# Patient Record
Sex: FEMALE | Race: WHITE | NOT HISPANIC OR LATINO | URBAN - METROPOLITAN AREA
[De-identification: names, ages, dates, MRNs, and addresses within clinical notes are randomized per-mention and may not be internally consistent; named-entity substitution may affect disease eponyms.]

---

## 2020-04-24 ENCOUNTER — APPOINTMENT (OUTPATIENT)
Age: 85
Setting detail: DERMATOLOGY
End: 2020-04-24

## 2020-04-24 DIAGNOSIS — L30.9 DERMATITIS, UNSPECIFIED: ICD-10-CM

## 2020-04-24 PROCEDURE — OTHER TREATMENT REGIMEN: OTHER

## 2020-04-24 PROCEDURE — OTHER RECOMMENDATIONS: OTHER

## 2020-04-24 PROCEDURE — 99202 OFFICE O/P NEW SF 15 MIN: CPT

## 2020-04-24 PROCEDURE — OTHER PRESCRIPTION: OTHER

## 2020-04-24 PROCEDURE — OTHER COUNSELING: OTHER

## 2020-04-24 RX ORDER — TRIAMCINOLONE ACETONIDE 1 MG/G
CREAM TOPICAL
Qty: 1 | Refills: 0 | Status: ERX

## 2020-04-24 ASSESSMENT — LOCATION SIMPLE DESCRIPTION DERM: LOCATION SIMPLE: LEFT THIGH

## 2020-04-24 ASSESSMENT — LOCATION DETAILED DESCRIPTION DERM: LOCATION DETAILED: LEFT ANTERIOR PROXIMAL THIGH

## 2020-04-24 ASSESSMENT — LOCATION ZONE DERM: LOCATION ZONE: LEG

## 2020-04-24 NOTE — PROCEDURE: TREATMENT REGIMEN
Plan: Patient was called after the appointment but no one answered.  I contacted Dr. Chan and am awaiting whether she can see this patient. Will have staff attempt to call the patient next week to let her know the plan.  Patient moving to [place], could see me in office when she arrives, or could see her old derm Dr. Boykin in [facility]. Plan: Patient was called after the appointment but no one answered.  I contacted Dr. Chan and am awaiting whether she can see this patient. Will have staff attempt to call the patient next week to let her know the plan.  Patient moving to [place], could see me in office when she arrives, or could see her old derm Dr. Boykin in [facility].

## 2020-04-24 NOTE — PROCEDURE: RECOMMENDATIONS
Recommendations (Free Text): Rash should be biopsied, patient lives in [place]. Has seen Dr Boykin in [place]. Recommendations (Free Text): Rash should be biopsied, patient lives in [place]. Has seen Dr Boykin in [place].

## 2020-10-02 ENCOUNTER — RX ONLY (RX ONLY)
Age: 85
End: 2020-10-02

## 2020-10-02 ENCOUNTER — APPOINTMENT (OUTPATIENT)
Dept: URBAN - METROPOLITAN AREA CLINIC 285 | Age: 85
Setting detail: DERMATOLOGY
End: 2020-10-02

## 2020-10-02 DIAGNOSIS — Z85.828 PERSONAL HISTORY OF OTHER MALIGNANT NEOPLASM OF SKIN: ICD-10-CM

## 2020-10-02 DIAGNOSIS — L12.0 BULLOUS PEMPHIGOID: ICD-10-CM

## 2020-10-02 DIAGNOSIS — L57.0 ACTINIC KERATOSIS: ICD-10-CM

## 2020-10-02 PROBLEM — D23.5 OTHER BENIGN NEOPLASM OF SKIN OF TRUNK: Status: ACTIVE | Noted: 2020-10-02

## 2020-10-02 PROCEDURE — OTHER COUNSELING: OTHER

## 2020-10-02 PROCEDURE — 17000 DESTRUCT PREMALG LESION: CPT

## 2020-10-02 PROCEDURE — OTHER PRESCRIPTION: OTHER

## 2020-10-02 PROCEDURE — 99214 OFFICE O/P EST MOD 30 MIN: CPT | Mod: 25

## 2020-10-02 PROCEDURE — OTHER TREATMENT REGIMEN: OTHER

## 2020-10-02 PROCEDURE — OTHER LIQUID NITROGEN: OTHER

## 2020-10-02 RX ORDER — TRIAMCINOLONE ACETONIDE 1 MG/G
OINTMENT TOPICAL
Qty: 1 | Refills: 0 | Status: ERX

## 2020-10-02 RX ORDER — NIACINAMIDE 500 MG
TABLET ORAL
Qty: 30 | Refills: 0 | Status: ERX

## 2020-10-02 RX ORDER — TRIAMCINOLONE ACETONIDE 1 MG/G
CREAM TOPICAL
Qty: 1 | Refills: 0 | Status: CANCELLED

## 2020-10-02 ASSESSMENT — LOCATION ZONE DERM
LOCATION ZONE: FACE
LOCATION ZONE: TRUNK
LOCATION ZONE: LIP

## 2020-10-02 ASSESSMENT — LOCATION DETAILED DESCRIPTION DERM
LOCATION DETAILED: LEFT UPPER CUTANEOUS LIP
LOCATION DETAILED: EPIGASTRIC SKIN
LOCATION DETAILED: RIGHT CENTRAL ZYGOMA

## 2020-10-02 ASSESSMENT — BSA RASH: BSA RASH: 30

## 2020-10-02 ASSESSMENT — LOCATION SIMPLE DESCRIPTION DERM
LOCATION SIMPLE: LEFT LIP
LOCATION SIMPLE: RIGHT ZYGOMA
LOCATION SIMPLE: ABDOMEN

## 2020-10-02 NOTE — PROCEDURE: TREATMENT REGIMEN
Discontinue Regimen: Clobetasol cream
Continue Regimen: Doxycycline 100 mg twice daily\\nTriamcinolone oint twice daily
Detail Level: Zone
Plan: Discussed starting prednisone as the next step.  Daughter says \"there was a problem with prednisone\" and when asked to clarify she said \"it didn't work\"
Initiate Treatment: Niacinomide 500 mg qday- will increase to tid

## 2020-10-13 ENCOUNTER — RX ONLY (RX ONLY)
Age: 85
End: 2020-10-13

## 2020-10-13 RX ORDER — TRIAMCINOLONE ACETONIDE 1 MG/G
OINTMENT TOPICAL
Qty: 1 | Refills: 0 | Status: ERX

## 2020-10-14 RX ORDER — TRIAMCINOLONE ACETONIDE 1 MG/G
CREAM TOPICAL
Qty: 1 | Refills: 0 | Status: ERX

## 2020-10-29 ENCOUNTER — APPOINTMENT (OUTPATIENT)
Dept: URBAN - METROPOLITAN AREA CLINIC 285 | Age: 85
Setting detail: DERMATOLOGY
End: 2020-10-29

## 2020-10-29 ENCOUNTER — RX ONLY (RX ONLY)
Age: 85
End: 2020-10-29

## 2020-10-29 DIAGNOSIS — L12.0 BULLOUS PEMPHIGOID: ICD-10-CM

## 2020-10-29 PROCEDURE — OTHER TREATMENT REGIMEN: OTHER

## 2020-10-29 PROCEDURE — OTHER ADDITIONAL NOTES: OTHER

## 2020-10-29 PROCEDURE — OTHER COUNSELING: OTHER

## 2020-10-29 PROCEDURE — 99214 OFFICE O/P EST MOD 30 MIN: CPT

## 2020-10-29 PROCEDURE — OTHER PRESCRIPTION: OTHER

## 2020-10-29 RX ORDER — NIACINAMIDE 500 MG
TABLET ORAL
Qty: 30 | Refills: 1 | Status: ERX | COMMUNITY
Start: 2020-10-29

## 2020-10-29 RX ORDER — CLOBETASOL PROPIONATE 0.5 MG/G
OINTMENT TOPICAL
Qty: 2 | Refills: 2 | Status: ERX | COMMUNITY
Start: 2020-10-29

## 2020-10-29 RX ORDER — TRIAMCINOLONE ACETONIDE 1 MG/G
CREAM TOPICAL
Qty: 1 | Refills: 1 | Status: ERX

## 2020-10-29 ASSESSMENT — LOCATION ZONE DERM: LOCATION ZONE: TRUNK

## 2020-10-29 ASSESSMENT — LOCATION DETAILED DESCRIPTION DERM: LOCATION DETAILED: EPIGASTRIC SKIN

## 2020-10-29 ASSESSMENT — LOCATION SIMPLE DESCRIPTION DERM: LOCATION SIMPLE: ABDOMEN

## 2020-10-29 NOTE — PROCEDURE: TREATMENT REGIMEN
Continue Regimen: Doxycycline 100 mg twice daily\\nTriamcinolone cream twice daily to body\\nNiacinomide 500 mg qday- increase to one pill daily (goal will be tid)

## 2020-10-29 NOTE — PROCEDURE: TREATMENT REGIMEN
Plan: Discussed prednisone. PatientS daughter is very [place] of starting prednisone. She states Adriana gets anxious in the past with prednisone.  Even swish for the mouth\\nDiscussed trying a stronger strength topical steroid.\\nDiscussed celcept - discussed blood testing with that medication\\nDiscussed patient is at the point where she should have a oral treatment.\\nDiscussed starting slowly on prednisone - daughter refused to start prednisone or other oral therapy even 5 mg\\nNot many other options to help control this autoimmune condition\\n\\nMed list reviewed no culprits identified Plan: Discussed prednisone. PatientS daughter is very [place] of starting prednisone. She states Adriana gets anxious in the past with prednisone.  Even swish for the mouth\\nDiscussed trying a stronger strength topical steroid.\\nDiscussed celcept - discussed blood testing with that medication\\nDiscussed patient is at the point where she should have a oral treatment.\\nDiscussed starting slowly on prednisone - daughter refused to start prednisone or other oral therapy even 5 mg\\nNot many other options to help control this autoimmune condition\\n\\nMed list reviewed no culprits identified

## 2020-11-23 ENCOUNTER — RX ONLY (RX ONLY)
Age: 85
End: 2020-11-23

## 2020-11-23 ENCOUNTER — APPOINTMENT (OUTPATIENT)
Dept: URBAN - METROPOLITAN AREA CLINIC 285 | Age: 85
Setting detail: DERMATOLOGY
End: 2020-11-23

## 2020-11-23 DIAGNOSIS — L12.0 BULLOUS PEMPHIGOID: ICD-10-CM

## 2020-11-23 PROCEDURE — 99213 OFFICE O/P EST LOW 20 MIN: CPT

## 2020-11-23 PROCEDURE — OTHER TREATMENT REGIMEN: OTHER

## 2020-11-23 PROCEDURE — OTHER COUNSELING: OTHER

## 2020-11-23 PROCEDURE — OTHER PRESCRIPTION: OTHER

## 2020-11-23 PROCEDURE — OTHER ADDITIONAL NOTES: OTHER

## 2020-11-23 PROCEDURE — OTHER ORDER TESTS: OTHER

## 2020-11-23 RX ORDER — MYCOPHENOLATE MOFETIL 500 MG/1
500 MG TABLET ORAL BID
Qty: 60 | Refills: 0 | Status: CANCELLED

## 2020-11-23 RX ORDER — DOXYCYCLINE HYCLATE 100 MG/1
CAPSULE, GELATIN COATED ORAL
Qty: 60 | Refills: 2 | Status: ERX

## 2020-11-23 RX ORDER — CLOBETASOL PROPIONATE 0.5 MG/G
OINTMENT TOPICAL
Qty: 2 | Refills: 2 | Status: ERX

## 2020-11-23 RX ORDER — PREDNISONE 10 MG/1
TABLET ORAL
Qty: 60 | Refills: 0 | Status: ERX

## 2020-11-23 ASSESSMENT — LOCATION SIMPLE DESCRIPTION DERM: LOCATION SIMPLE: ABDOMEN

## 2020-11-23 ASSESSMENT — LOCATION DETAILED DESCRIPTION DERM: LOCATION DETAILED: EPIGASTRIC SKIN

## 2020-11-23 ASSESSMENT — LOCATION ZONE DERM: LOCATION ZONE: TRUNK

## 2020-11-23 ASSESSMENT — PAIN INTENSITY VAS: HOW INTENSE IS YOUR PAIN 0 BEING NO PAIN, 10 BEING THE MOST SEVERE PAIN POSSIBLE?: 5/10 PAIN

## 2020-11-23 ASSESSMENT — SEVERITY ASSESSMENT: SEVERITY: MODERATE

## 2020-11-23 ASSESSMENT — BSA RASH: BSA RASH: 56

## 2020-11-23 NOTE — PROCEDURE: TREATMENT REGIMEN
Initiate Treatment: Mycophenolate 500 bid - will start once labs are in\\nPrednisone 10 mg take 2 pills qam with food
Plan: Refer to [facility] for rituximab
Detail Level: Zone
Continue Regimen: Triamcinolone cream twice daily to body\\nNiacinomide 500 mg BID\\nPRN. Clobetasol oint bid to feet, under occlusion.

## 2020-11-23 NOTE — PROCEDURE: ORDER TESTS
Clinical Notes (To The Lab): Hepatitis panel
Billing Type: Third-Party Bill
Expected Date Of Service: 11/23/2020
Bill For Surgical Tray: no

## 2020-12-01 ENCOUNTER — RX ONLY (RX ONLY)
Age: 85
End: 2020-12-01

## 2020-12-01 RX ORDER — MYCOPHENOLATE MOFETIL 500 MG/1
1 TABLET ORAL BID
Qty: 60 | Refills: 0 | Status: ERX

## 2020-12-23 ENCOUNTER — RX ONLY (RX ONLY)
Age: 85
End: 2020-12-23

## 2020-12-23 ENCOUNTER — APPOINTMENT (OUTPATIENT)
Dept: URBAN - METROPOLITAN AREA CLINIC 285 | Age: 85
Setting detail: DERMATOLOGY
End: 2020-12-23

## 2020-12-23 DIAGNOSIS — L12.0 BULLOUS PEMPHIGOID: ICD-10-CM

## 2020-12-23 PROCEDURE — OTHER TREATMENT REGIMEN: OTHER

## 2020-12-23 PROCEDURE — OTHER COUNSELING: OTHER

## 2020-12-23 PROCEDURE — 99214 OFFICE O/P EST MOD 30 MIN: CPT

## 2020-12-23 PROCEDURE — OTHER ORDER TESTS: OTHER

## 2020-12-23 PROCEDURE — OTHER PRESCRIPTION: OTHER

## 2020-12-23 RX ORDER — PREDNISONE 1 MG/1
TABLET ORAL
Qty: 63 | Refills: 0 | Status: ERX

## 2020-12-23 RX ORDER — PREDNISONE 2.5 MG/1
TABLET ORAL
Qty: 35 | Refills: 0 | Status: ERX

## 2020-12-23 RX ORDER — MYCOPHENOLATE MOFETIL 500 MG/1
TABLET ORAL
Qty: 120 | Refills: 2 | Status: ERX | COMMUNITY
Start: 2020-12-23

## 2020-12-23 RX ORDER — PREDNISONE 10 MG/1
TABLET ORAL
Qty: 7 | Refills: 0 | Status: ERX

## 2020-12-23 RX ORDER — TRIAMCINOLONE ACETONIDE 1 MG/G
CREAM TOPICAL
Qty: 1 | Refills: 2 | Status: ERX | COMMUNITY
Start: 2020-12-23

## 2020-12-23 RX ORDER — PREDNISONE 5 MG/1
TABLET ORAL
Qty: 21 | Refills: 0 | Status: ERX

## 2020-12-23 RX ORDER — DOXYCYCLINE HYCLATE 100 MG/1
CAPSULE, GELATIN COATED ORAL
Qty: 60 | Refills: 2 | Status: ERX | COMMUNITY
Start: 2020-12-23

## 2020-12-23 RX ORDER — PREDNISONE 2.5 MG/1
TABLET ORAL
Qty: 49 | Refills: 0 | Status: ERX

## 2020-12-23 NOTE — PROCEDURE: TREATMENT REGIMEN
Modify Regimen: mycophenalate 500mg 2 tab twice daily (2000mg daily)
Modify Regimen: Increase mycophenolate to 500mg- two pills twice daily after finishes current rx
Detail Level: Zone
Continue Regimen: Doxycycline 100mg bid
Plan: repeat CBC with diff and CMP in 3-4 weeks\\nneed hep and quant in 11/2021
Initiate Treatment: prednisone 17.5mg daily x 1 week, then\\nprednisone 15mg daily x 1 week, then\\nprednisone 12.5mg daily x 1 week, then\\nprednisone 10mg daily x 1 week, then\\nprednisone 9mg daily x 1 week, then\\ncontinue to decrease by 1mg weekly
Plan: rituximab not recommended per [facility] as pt is well controlled with pred and MMF\\nif no improvement try MTX 2.5-7.5mg weekly with folic acid

## 2021-01-19 ENCOUNTER — RX ONLY (RX ONLY)
Age: 86
End: 2021-01-19

## 2021-01-19 RX ORDER — PREDNISONE 1 MG/1
TABLET ORAL
Qty: 206 | Refills: 0 | Status: ERX

## 2021-01-19 RX ORDER — PREDNISONE 1 MG/1
TABLET ORAL
Qty: 42 | Refills: 0 | Status: ERX

## 2021-02-04 NOTE — PROCEDURE: COUNSELING
Detail Level: Detailed
Detail Level: Detailed
Quality 110: Preventive Care And Screening: Influenza Immunization: Influenza Immunization Administered during Influenza season
Quality 111:Pneumonia Vaccination Status For Older Adults: Pneumococcal Vaccination Previously Received

## 2021-02-15 ENCOUNTER — APPOINTMENT (OUTPATIENT)
Dept: URBAN - METROPOLITAN AREA CLINIC 285 | Age: 86
Setting detail: DERMATOLOGY
End: 2021-02-15

## 2021-02-15 DIAGNOSIS — Z79.899 OTHER LONG TERM (CURRENT) DRUG THERAPY: ICD-10-CM

## 2021-02-15 DIAGNOSIS — L12.0 BULLOUS PEMPHIGOID: ICD-10-CM

## 2021-02-15 PROCEDURE — OTHER ADDITIONAL NOTES: OTHER

## 2021-02-15 PROCEDURE — OTHER PRESCRIPTION MEDICATION MANAGEMENT: OTHER

## 2021-02-15 PROCEDURE — OTHER MIPS QUALITY: OTHER

## 2021-02-15 PROCEDURE — OTHER PRESCRIPTION: OTHER

## 2021-02-15 PROCEDURE — OTHER ORDER TESTS: OTHER

## 2021-02-15 PROCEDURE — OTHER MEDICATION COUNSELING: OTHER

## 2021-02-15 PROCEDURE — OTHER COUNSELING: OTHER

## 2021-02-15 PROCEDURE — 99214 OFFICE O/P EST MOD 30 MIN: CPT

## 2021-02-15 RX ORDER — MYCOPHENOLATE MOFETIL 500 MG/1
1 TABLET ORAL TID
Qty: 270 | Refills: 0 | Status: ERX

## 2021-02-15 NOTE — PROCEDURE: PRESCRIPTION MEDICATION MANAGEMENT
Render In Strict Bullet Format?: No
Initiate Treatment: Mycophenolate 500mg three times daily starting 2/15\\nMycophenolate 500mg four times daily starting 3/15 (can take 2 in AM and 2 in PM)
Continue Regimen: -Doxycycline 100mg bid.\\n-clobetasol to new spots on hands and feet\\n-triamcinolone to new spots on legs and trunk\\n-multivitamin with calcium and vit D
Detail Level: Zone
Plan: Rituximab not recommended per [facility] as pt is well controlled with pred and MMF\\nif no improvement try MTX 2.5-7.5mg weekly with folic acid. \\nToday CBC CMP pending\\nRepeat CBC with diff and CMP in 3-4 weeks\\nneed hep and quant in 11/2021.

## 2021-02-15 NOTE — PROCEDURE: MEDICATION COUNSELING
Xelankitaz Pregnancy And Lactation Text: This medication is Pregnancy Category D and is not considered safe during pregnancy.  The risk during breast feeding is also uncertain.

## 2021-02-15 NOTE — PROCEDURE: MEDICATION COUNSELING
Home Ketoconazole Counseling:   Patient counseled regarding improving absorption with orange juice.  Adverse effects include but are not limited to breast enlargement, headache, diarrhea, nausea, upset stomach, liver function test abnormalities, taste disturbance, and stomach pain.  There is a rare possibility of liver failure that can occur when taking ketoconazole. The patient understands that monitoring of LFTs may be required, especially at baseline. The patient verbalized understanding of the proper use and possible adverse effects of ketoconazole.  All of the patient's questions and concerns were addressed.

## 2021-03-10 ENCOUNTER — APPOINTMENT (OUTPATIENT)
Dept: URBAN - METROPOLITAN AREA CLINIC 285 | Age: 86
Setting detail: DERMATOLOGY
End: 2021-03-10

## 2021-03-10 DIAGNOSIS — Z79.899 OTHER LONG TERM (CURRENT) DRUG THERAPY: ICD-10-CM

## 2021-03-10 DIAGNOSIS — I87.2 VENOUS INSUFFICIENCY (CHRONIC) (PERIPHERAL): ICD-10-CM

## 2021-03-10 DIAGNOSIS — L12.0 BULLOUS PEMPHIGOID: ICD-10-CM

## 2021-03-10 DIAGNOSIS — R60.0 LOCALIZED EDEMA: ICD-10-CM

## 2021-03-10 PROCEDURE — OTHER ADDITIONAL NOTES: OTHER

## 2021-03-10 PROCEDURE — OTHER MEDICATION COUNSELING: OTHER

## 2021-03-10 PROCEDURE — OTHER PRESCRIPTION: OTHER

## 2021-03-10 PROCEDURE — 99214 OFFICE O/P EST MOD 30 MIN: CPT

## 2021-03-10 PROCEDURE — OTHER COUNSELING: OTHER

## 2021-03-10 PROCEDURE — OTHER PRESCRIPTION MEDICATION MANAGEMENT: OTHER

## 2021-03-10 RX ORDER — BETAMETHASONE DIPROPIONATE 0.5 MG/G
OINTMENT TOPICAL
Qty: 3 | Refills: 2 | Status: ERX | COMMUNITY
Start: 2021-03-10

## 2021-03-10 ASSESSMENT — LOCATION SIMPLE DESCRIPTION DERM
LOCATION SIMPLE: LEFT PRETIBIAL REGION
LOCATION SIMPLE: RIGHT PRETIBIAL REGION

## 2021-03-10 ASSESSMENT — LOCATION DETAILED DESCRIPTION DERM
LOCATION DETAILED: RIGHT DISTAL PRETIBIAL REGION
LOCATION DETAILED: LEFT DISTAL PRETIBIAL REGION

## 2021-03-10 ASSESSMENT — LOCATION ZONE DERM: LOCATION ZONE: LEG

## 2021-03-10 NOTE — PROCEDURE: ADDITIONAL NOTES
Detail Level: Simple
Additional Notes: Pt needs to see primary care about leg swelling, keep legs elevated feet above ankles and ankles above hips as often as possible, pt needs to also consider compression stockings or ace bandage wraps if stockings are too difficult to put on.
Render Risk Assessment In Note?: no
Additional Notes: Labs are pending for today - nurse was unable to come to home for labs

## 2021-03-10 NOTE — PROCEDURE: PRESCRIPTION MEDICATION MANAGEMENT
Render In Strict Bullet Format?: No
Detail Level: Zone
Continue Regimen: -Doxycycline 100mg bid.\\n-clobetasol to new spots on legs and trunk\\n-multivitamin with calcium and vit D\\n-Mycophenolate 500mg four times daily (started this week)
Plan: Rituximab not recommended per [facility] as pt is well controlled with pred and MMF\\nTrial of MMF for 3 mo\\nif no improvement try MTX 2.5-7.5mg weekly with folic acid. \\nPt needs to see primary care for leg swelling.\\nEncouraged pt to give the mycophenolate pills 2-3 months longer for medication to work before trying a new oral medication.\\nPrednisone not recommended at this time as lesions are limited to thighs and previous history of lab abnormalities while taking prednisone
Modify Regimen: -clobetasol to new spots on hands and feet once pt runs out switch to betamethasone dipropionate 0.05 % topical ointment apply to new spots on legs and hands

## 2021-04-02 ENCOUNTER — RX ONLY (RX ONLY)
Age: 86
End: 2021-04-02

## 2021-04-02 RX ORDER — NIACINAMIDE 500 MG
TABLET ORAL
Qty: 30 | Refills: 1 | Status: ERX

## 2021-04-19 ENCOUNTER — RX ONLY (RX ONLY)
Age: 86
End: 2021-04-19

## 2021-04-19 ENCOUNTER — APPOINTMENT (OUTPATIENT)
Dept: URBAN - METROPOLITAN AREA CLINIC 285 | Age: 86
Setting detail: DERMATOLOGY
End: 2021-04-19

## 2021-04-19 DIAGNOSIS — T07XXXA ABRASION OR FRICTION BURN OF OTHER, MULTIPLE, AND UNSPECIFIED SITES, WITHOUT MENTION OF INFECTION: ICD-10-CM

## 2021-04-19 DIAGNOSIS — Z79.899 OTHER LONG TERM (CURRENT) DRUG THERAPY: ICD-10-CM

## 2021-04-19 DIAGNOSIS — L12.0 BULLOUS PEMPHIGOID: ICD-10-CM

## 2021-04-19 PROBLEM — L30.9 DERMATITIS, UNSPECIFIED: Status: ACTIVE | Noted: 2021-04-19

## 2021-04-19 PROCEDURE — OTHER ORDER TESTS: OTHER

## 2021-04-19 PROCEDURE — OTHER PRESCRIPTION: OTHER

## 2021-04-19 PROCEDURE — OTHER ADDITIONAL NOTES: OTHER

## 2021-04-19 PROCEDURE — OTHER COUNSELING: OTHER

## 2021-04-19 PROCEDURE — OTHER MEDICATION COUNSELING: OTHER

## 2021-04-19 PROCEDURE — 99214 OFFICE O/P EST MOD 30 MIN: CPT

## 2021-04-19 PROCEDURE — OTHER PRESCRIPTION MEDICATION MANAGEMENT: OTHER

## 2021-04-19 RX ORDER — MYCOPHENOLATE MOFETIL 500 MG/1
TABLET ORAL
Qty: 360 | Refills: 1 | Status: ERX

## 2021-04-19 RX ORDER — MYCOPHENOLATE MOFETIL 500 MG/1
1 TABLET ORAL TID
Qty: 270 | Refills: 0 | Status: ERX

## 2021-04-19 NOTE — PROCEDURE: ADDITIONAL NOTES
Detail Level: Simple
Additional Notes: Labs reviewed
Additional Notes: Viral PCR sent but could not find order in [facility] (see sparrow order form)
Render Risk Assessment In Note?: no

## 2021-04-19 NOTE — PROCEDURE: PRESCRIPTION MEDICATION MANAGEMENT
Modify Regimen: -clobetasol to new spots on hands and feet once pt runs out switch to betamethasone dipropionate 0.05 % topical ointment apply to new spots on legs and hands
Detail Level: Zone
Render In Strict Bullet Format?: No
Plan: Rituximab not recommended per [facility] as pt is well controlled with pred and MMF\\nTrial of MMF for 3 mo\\nif no improvement try MTX 2.5-7.5mg weekly with folic acid. \\nPt needs to see primary care for leg swelling.\\nEncouraged pt to give the mycophenolate pills 2-3 months longer for medication to work before trying a new oral medication.\\nPrednisone not recommended at this time as lesions are limited to thighs and previous history of lab abnormalities while taking prednisone
Continue Regimen: -Doxycycline 100mg bid.\\n-clobetasol to new spots on legs and trunk\\n-multivitamin with calcium and vit D\\n-Mycophenolate 500mg four times daily (started this week)

## 2021-04-19 NOTE — PROCEDURE: MEDICATION COUNSELING
Yes Tranexamic Acid Counseling:  Patient advised of the small risk of bleeding problems with tranexamic acid. They were also instructed to call if they developed any nausea, vomiting or diarrhea. All of the patient's questions and concerns were addressed.

## 2021-06-22 ENCOUNTER — RX ONLY (RX ONLY)
Age: 86
End: 2021-06-22

## 2021-06-22 RX ORDER — CLOBETASOL PROPIONATE 0.5 MG/G
OINTMENT TOPICAL
Qty: 2 | Refills: 2 | Status: ERX

## 2021-06-25 ENCOUNTER — RX ONLY (RX ONLY)
Age: 86
End: 2021-06-25

## 2021-06-25 RX ORDER — CLOBETASOL PROPIONATE 0.5 MG/G
OINTMENT TOPICAL
Qty: 2 | Refills: 2 | Status: ERX

## 2021-07-25 NOTE — PROCEDURE: MEDICATION COUNSELING
H Cimzia Pregnancy And Lactation Text: This medication crosses the placenta but can be considered safe in certain situations. Cimzia may be excreted in breast milk.

## 2021-08-02 NOTE — PROCEDURE: ADDITIONAL NOTES
Addended by: MEE ALVES on: 8/2/2021 10:15 AM     Modules accepted: Orders    
Additional Notes: Labs are pending for today - nurse was unable to come to home for labs
Render Risk Assessment In Note?: no
Detail Level: Simple

## 2021-09-22 NOTE — PROCEDURE: MEDICATION COUNSELING
Finasteride Pregnancy And Lactation Text: This medication is absolutely contraindicated during pregnancy. It is unknown if it is excreted in breast milk. Propranolol Pregnancy And Lactation Text: This medication is Pregnancy Category C and it isn't known if it is safe during pregnancy. It is excreted in breast milk.

## 2025-01-06 NOTE — PROCEDURE: MEDICATION COUNSELING
109 Xeljanz Counseling: I discussed with the patient the risks of Xeljanz therapy including increased risk of infection, liver issues, headache, diarrhea, or cold symptoms. Live vaccines should be avoided. They were instructed to call if they have any problems.